# Patient Record
Sex: FEMALE | Race: WHITE | NOT HISPANIC OR LATINO
[De-identification: names, ages, dates, MRNs, and addresses within clinical notes are randomized per-mention and may not be internally consistent; named-entity substitution may affect disease eponyms.]

---
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
## 2022-02-21 ENCOUNTER — TRANSCRIPTION ENCOUNTER (OUTPATIENT)
Age: 22
End: 2022-02-21

## 2022-03-26 ENCOUNTER — TRANSCRIPTION ENCOUNTER (OUTPATIENT)
Age: 22
End: 2022-03-26

## 2023-03-02 ENCOUNTER — TELEPHONE (OUTPATIENT)
Dept: GASTROENTEROLOGY | Facility: CLINIC | Age: 23
End: 2023-03-02
Payer: COMMERCIAL

## 2023-03-02 NOTE — TELEPHONE ENCOUNTER
Called & spoke to pt's mom  - Denies diagnosis of IBD  - Informed that Dr. Perez is an IBD specialist  - Pt's mom expressed understanding

## 2023-03-02 NOTE — TELEPHONE ENCOUNTER
----- Message from Stephanie Jean sent at 3/2/2023  9:43 AM CST -----  Regarding: appt  Contact: pt [phone]  Patient mother called she did not want to provide any information stated that she  wanted an Urgent appt with Dr. Oralia Perez offered a sooner appt. Patient's mother was upset that she was not scheduled with Dr. Perez for an urgent appt. Pt's mother was very rude. Pls call [phone]

## 2023-03-03 ENCOUNTER — PATIENT MESSAGE (OUTPATIENT)
Dept: SURGERY | Facility: CLINIC | Age: 23
End: 2023-03-03
Payer: COMMERCIAL

## 2023-03-06 ENCOUNTER — PATIENT MESSAGE (OUTPATIENT)
Dept: ENDOSCOPY | Facility: HOSPITAL | Age: 23
End: 2023-03-06
Payer: COMMERCIAL

## 2023-03-06 ENCOUNTER — TELEPHONE (OUTPATIENT)
Dept: SURGERY | Facility: CLINIC | Age: 23
End: 2023-03-06
Payer: COMMERCIAL

## 2023-03-06 ENCOUNTER — PATIENT MESSAGE (OUTPATIENT)
Dept: SURGERY | Facility: CLINIC | Age: 23
End: 2023-03-06

## 2023-03-06 ENCOUNTER — OFFICE VISIT (OUTPATIENT)
Dept: SURGERY | Facility: CLINIC | Age: 23
End: 2023-03-06
Payer: COMMERCIAL

## 2023-03-06 VITALS
BODY MASS INDEX: 19.82 KG/M2 | RESPIRATION RATE: 19 BRPM | HEART RATE: 85 BPM | HEIGHT: 70 IN | WEIGHT: 138.44 LBS | SYSTOLIC BLOOD PRESSURE: 136 MMHG | DIASTOLIC BLOOD PRESSURE: 83 MMHG | OXYGEN SATURATION: 98 %

## 2023-03-06 DIAGNOSIS — R10.32 LEFT LOWER QUADRANT PAIN: Primary | ICD-10-CM

## 2023-03-06 DIAGNOSIS — R19.7 DIARRHEA, UNSPECIFIED TYPE: ICD-10-CM

## 2023-03-06 PROCEDURE — 99999 PR PBB SHADOW E&M-EST. PATIENT-LVL IV: CPT | Mod: PBBFAC,,, | Performed by: SURGERY

## 2023-03-06 PROCEDURE — 99204 PR OFFICE/OUTPT VISIT, NEW, LEVL IV, 45-59 MIN: ICD-10-PCS | Mod: S$GLB,,, | Performed by: SURGERY

## 2023-03-06 PROCEDURE — 99204 OFFICE O/P NEW MOD 45 MIN: CPT | Mod: S$GLB,,, | Performed by: SURGERY

## 2023-03-06 PROCEDURE — 99999 PR PBB SHADOW E&M-EST. PATIENT-LVL IV: ICD-10-PCS | Mod: PBBFAC,,, | Performed by: SURGERY

## 2023-03-06 RX ORDER — DEXTROAMPHETAMINE SACCHARATE, AMPHETAMINE ASPARTATE, DEXTROAMPHETAMINE SULFATE AND AMPHETAMINE SULFATE 2.5; 2.5; 2.5; 2.5 MG/1; MG/1; MG/1; MG/1
TABLET ORAL
COMMUNITY
Start: 2022-08-09

## 2023-03-06 RX ORDER — ALBUTEROL SULFATE 90 UG/1
2 AEROSOL, METERED RESPIRATORY (INHALATION) EVERY 6 HOURS PRN
COMMUNITY
Start: 2022-03-30

## 2023-03-06 RX ORDER — NORETHINDRONE ACETATE AND ETHINYL ESTRADIOL, AND FERROUS FUMARATE 1MG-20(24)
KIT ORAL
COMMUNITY
Start: 2022-03-25

## 2023-03-06 RX ORDER — LISDEXAMFETAMINE DIMESYLATE 50 MG/1
CAPSULE ORAL
COMMUNITY
Start: 2022-03-25

## 2023-03-06 RX ORDER — ESCITALOPRAM OXALATE 20 MG/1
20 TABLET ORAL
COMMUNITY
Start: 2023-02-03

## 2023-03-06 NOTE — PROGRESS NOTES
"CRS Office Visit History and Physical    Referring Md:   Aaareferral Self  No address on file    SUBJECTIVE:     Chief Complaint: diarrhea, rectal bleeding    History of Present Illness:  The patient is a new patient to this practice.   Course is as follows:  Brian Junior is a 23 y.o. female presents with diarrhea and rectal bleeding.  She reports a history of IBS.  She has undergone an extensive work up in [place].  She reports multiple stool tests as well as an EGD and colonoscopy (5/2021).  Endoscopy records reviewed and there was no evidence of inflammatory bowel disease or colitis.  Biopsies taken and were reportedly unremarkable.  She reports ongoing diarrhea and is also having LLQ pain.  Recently had bright red blood per rectum.  No known food sensitivities.  Travelled to [place] 2 months ago.  No camping or drinking stream water.  Has a family history significant for crohns disease.      Last Colonoscopy: 5/2021, [place]     Allergies: none     FmHx: Crohns disease    PMx:   IBS     PSH:   none    Review of Systems:  Review of Systems   All other systems reviewed and are negative.    OBJECTIVE:     Vital Signs (Most Recent)  /83 (BP Location: Left arm, Patient Position: Sitting)   Pulse 85   Resp 19   Ht 5' 10" (1.778 m)   Wt 62.8 kg (138 lb 7.2 oz)   SpO2 98%   BMI 19.87 kg/m²     Physical Exam:  General: 23 y.o. female in no distress   Neuro: alert and oriented x 4.  Moves all extremities.     HEENT: normocephalic, atraumatic, PERRL, EOMI   Respiratory: respirations are even and unlabored  Cardiac: regular rate and rhythm  Abdomen: soft, NTND  Extremities: Warm dry and intact  Skin: no rashes  Anorectal: no external masses or lesions, JAVIER with intact tone, no masses, no blood     Anoscopy:   Verbal consent obtained.  A lubricated anoscope was inserted into the anal canal and circumferential inspection performed.  There were nonbleeding internal hemorrhoids noted, stigmata of prolapse in the left " posterolateral position.  No evidence of proctitis.  The scope was withdrawn.     Labs: NA    Imaging: NA      ASSESSMENT/PLAN:     Diagnoses and all orders for this visit:    Left lower quadrant pain  -     CT Abdomen Pelvis With Contrast; Future    Diarrhea, unspecified type  -     Ambulatory referral/consult to Gastroenterology; Future        23 y.o. female with IBS, diarrhea, and abdominal pain     - Outside records reviewed with patient.   - On exam, she does have internal hemorrhoids.  We discussed that diarrhea can exacerbate hemorrhoids due to straining and frequent bowel movements.  Her rectal bleeding is painless, most likely from hemorrhoids. Will start Fibercon tabs, 2 tabs TID.   - Patient with LLQ pain that is worsening.  Will obtain CT AP to rule out colitis, diverticulitis and evaluate for other pathologies   - The majority of her work up was 2 years ago.  She will benefit from evaluation by GI. May need repeat endoscopy. Referral to GI placed.     Viki Stephen MD  Staff Surgeon  Colon & Rectal Surgery

## 2023-03-06 NOTE — TELEPHONE ENCOUNTER
Spoke with pt's mother, Brissa, regarding CT scan. Informed that CT scan is to rule out causes of pt's diarrhea and will be completed with oral contrast. GI will manage care going forward unless surgical intervention is required. Informed that GI has been contacted and pt has appt in [facility] in April. Informed that requests to be seen sooner will need to go through GI. Brissa verbalized understanding. Denied further questions. Message sent to pt confirming requested conversation.       ----- Message from Teresa Cardona sent at 3/6/2023  9:59 AM CST -----  Contact: @[phone]  Caller mom is calling in stating that she would like to talk to Dr. Stephen in regards to the CT with contrast the pt has scheduled Friday her  is allergic. please call to discuss

## 2023-03-10 ENCOUNTER — TELEPHONE (OUTPATIENT)
Dept: SURGERY | Facility: CLINIC | Age: 23
End: 2023-03-10
Payer: COMMERCIAL

## 2023-03-10 ENCOUNTER — HOSPITAL ENCOUNTER (OUTPATIENT)
Dept: RADIOLOGY | Facility: OTHER | Age: 23
Discharge: HOME | End: 2023-03-10
Attending: SURGERY
Payer: COMMERCIAL

## 2023-03-10 ENCOUNTER — PATIENT MESSAGE (OUTPATIENT)
Dept: SURGERY | Facility: CLINIC | Age: 23
End: 2023-03-10
Payer: COMMERCIAL

## 2023-03-10 ENCOUNTER — TELEPHONE (OUTPATIENT)
Dept: SURGERY | Facility: OTHER | Age: 23
End: 2023-03-10
Payer: COMMERCIAL

## 2023-03-10 DIAGNOSIS — R10.32 LEFT LOWER QUADRANT PAIN: ICD-10-CM

## 2023-03-10 PROCEDURE — 74177 CT ABDOMEN PELVIS WITH CONTRAST: ICD-10-PCS | Mod: 26,,, | Performed by: RADIOLOGY

## 2023-03-10 PROCEDURE — 25500020 PHARM REV CODE 255: Performed by: SURGERY

## 2023-03-10 PROCEDURE — 74177 CT ABD & PELVIS W/CONTRAST: CPT | Mod: 26,,, | Performed by: RADIOLOGY

## 2023-03-10 PROCEDURE — 74177 CT ABD & PELVIS W/CONTRAST: CPT | Mod: TC

## 2023-03-10 PROCEDURE — A9698 NON-RAD CONTRAST MATERIALNOC: HCPCS | Performed by: SURGERY

## 2023-03-10 RX ORDER — CIPROFLOXACIN 500 MG/1
500 TABLET ORAL EVERY 12 HOURS
Qty: 20 TABLET | Refills: 0 | Status: SHIPPED | OUTPATIENT
Start: 2023-03-10

## 2023-03-10 RX ORDER — METRONIDAZOLE 500 MG/1
500 TABLET ORAL EVERY 8 HOURS
Qty: 30 TABLET | Refills: 0 | Status: SHIPPED | OUTPATIENT
Start: 2023-03-10

## 2023-03-10 RX ADMIN — IOHEXOL 75 ML: 350 INJECTION, SOLUTION INTRAVENOUS at 09:03

## 2023-03-10 RX ADMIN — IOHEXOL 1000 ML: 9 SOLUTION ORAL at 09:03

## 2023-03-10 NOTE — TELEPHONE ENCOUNTER
"Spoke with pt's mother regarding CT scan results. Informed Brissa that Dr. Stephen attempted to contact the pt this morning and will attempt to contact again today. Brissa states, "Her daughter is taking midterms and will not be able to answer. She is d/t leave the city around 1-2 PM and would like a call to her number to explain results." Informed that request will be forwarded to Dr. Stephen to contact her regarding results.       ----- Message from Cecilia Staples sent at 3/10/2023 10:09 AM CST -----  Regarding: Results  Contact: [phone]  Pt's mother Brissa requesting results from CT completed today. Pt's mother leaving out of own soon, and would like to know if everything was okay before she leave Please call and adv @ [phone]    "

## 2023-03-10 NOTE — TELEPHONE ENCOUNTER
Attempted to call patient and discuss results of CT scan.  No answer, unable to leave voicemail.  Will try to call again later.     Viki Stephen MD  Staff Surgeon   Colon & Rectal Surgery

## 2023-03-10 NOTE — TELEPHONE ENCOUNTER
Spoke with patient's mother regarding results of CT abd/pel.  Will treat with 10 day course of antibiotics.  Will arrange follow up in my office in 2-3 weeks.  Patient is returning to [place] in May after school and will follow up with GI there.       Viki Stephen MD  Staff Surgeon   Colon & Rectal Surgery

## 2023-03-13 ENCOUNTER — TELEPHONE (OUTPATIENT)
Dept: SURGERY | Facility: CLINIC | Age: 23
End: 2023-03-13
Payer: COMMERCIAL

## 2023-04-07 ENCOUNTER — NON-APPOINTMENT (OUTPATIENT)
Age: 23
End: 2023-04-07

## 2024-02-18 ENCOUNTER — NON-APPOINTMENT (OUTPATIENT)
Age: 24
End: 2024-02-18

## 2024-10-09 ENCOUNTER — NON-APPOINTMENT (OUTPATIENT)
Age: 24
End: 2024-10-09

## 2025-01-22 ENCOUNTER — NON-APPOINTMENT (OUTPATIENT)
Age: 25
End: 2025-01-22

## 2025-06-04 ENCOUNTER — NON-APPOINTMENT (OUTPATIENT)
Age: 25
End: 2025-06-04